# Patient Record
Sex: MALE | Race: WHITE | NOT HISPANIC OR LATINO | Employment: FULL TIME | URBAN - METROPOLITAN AREA
[De-identification: names, ages, dates, MRNs, and addresses within clinical notes are randomized per-mention and may not be internally consistent; named-entity substitution may affect disease eponyms.]

---

## 2018-01-18 ENCOUNTER — APPOINTMENT (EMERGENCY)
Dept: CT IMAGING | Facility: HOSPITAL | Age: 23
End: 2018-01-18
Payer: COMMERCIAL

## 2018-01-18 ENCOUNTER — HOSPITAL ENCOUNTER (EMERGENCY)
Facility: HOSPITAL | Age: 23
Discharge: HOME | End: 2018-01-19
Attending: EMERGENCY MEDICINE | Admitting: EMERGENCY MEDICINE
Payer: COMMERCIAL

## 2018-01-18 VITALS
HEIGHT: 74 IN | SYSTOLIC BLOOD PRESSURE: 121 MMHG | BODY MASS INDEX: 27.36 KG/M2 | WEIGHT: 213.2 LBS | DIASTOLIC BLOOD PRESSURE: 62 MMHG | OXYGEN SATURATION: 99 % | RESPIRATION RATE: 16 BRPM | TEMPERATURE: 98.1 F | HEART RATE: 86 BPM

## 2018-01-18 DIAGNOSIS — S01.81XA FACIAL LACERATION, INITIAL ENCOUNTER: ICD-10-CM

## 2018-01-18 DIAGNOSIS — S09.90XA INJURY OF HEAD, INITIAL ENCOUNTER: Primary | ICD-10-CM

## 2018-01-18 DIAGNOSIS — G40.919 BREAKTHROUGH SEIZURE (HCC): ICD-10-CM

## 2018-01-18 LAB
BASOPHILS # BLD AUTO: 0.02 THOUSANDS/ΜL (ref 0–0.1)
BASOPHILS NFR BLD AUTO: 0 % (ref 0–1)
EOSINOPHIL # BLD AUTO: 0.02 THOUSAND/ΜL (ref 0–0.61)
EOSINOPHIL NFR BLD AUTO: 0 % (ref 0–6)
ERYTHROCYTE [DISTWIDTH] IN BLOOD BY AUTOMATED COUNT: 11.9 % (ref 11.6–15.1)
HCT VFR BLD AUTO: 45.2 % (ref 36.5–49.3)
HGB BLD-MCNC: 15.9 G/DL (ref 12–17)
LYMPHOCYTES # BLD AUTO: 1.29 THOUSANDS/ΜL (ref 0.6–4.47)
LYMPHOCYTES NFR BLD AUTO: 15 % (ref 14–44)
MCH RBC QN AUTO: 29.4 PG (ref 26.8–34.3)
MCHC RBC AUTO-ENTMCNC: 35.2 G/DL (ref 31.4–37.4)
MCV RBC AUTO: 84 FL (ref 82–98)
MONOCYTES # BLD AUTO: 0.58 THOUSAND/ΜL (ref 0.17–1.22)
MONOCYTES NFR BLD AUTO: 7 % (ref 4–12)
NEUTROPHILS # BLD AUTO: 6.53 THOUSANDS/ΜL (ref 1.85–7.62)
NEUTS SEG NFR BLD AUTO: 77 % (ref 43–75)
NRBC BLD AUTO-RTO: 0 /100 WBCS
PLATELET # BLD AUTO: 256 THOUSANDS/UL (ref 149–390)
PMV BLD AUTO: 10.5 FL (ref 8.9–12.7)
RBC # BLD AUTO: 5.4 MILLION/UL (ref 3.88–5.62)
WBC # BLD AUTO: 8.47 THOUSAND/UL (ref 4.31–10.16)

## 2018-01-18 PROCEDURE — 85025 COMPLETE CBC W/AUTO DIFF WBC: CPT | Performed by: EMERGENCY MEDICINE

## 2018-01-18 PROCEDURE — 72125 CT NECK SPINE W/O DYE: CPT

## 2018-01-18 PROCEDURE — 80048 BASIC METABOLIC PNL TOTAL CA: CPT | Performed by: EMERGENCY MEDICINE

## 2018-01-18 PROCEDURE — 70486 CT MAXILLOFACIAL W/O DYE: CPT

## 2018-01-18 PROCEDURE — 36415 COLL VENOUS BLD VENIPUNCTURE: CPT | Performed by: EMERGENCY MEDICINE

## 2018-01-18 PROCEDURE — 84146 ASSAY OF PROLACTIN: CPT | Performed by: EMERGENCY MEDICINE

## 2018-01-18 PROCEDURE — 70450 CT HEAD/BRAIN W/O DYE: CPT

## 2018-01-18 RX ORDER — LIDOCAINE HYDROCHLORIDE AND EPINEPHRINE 10; 10 MG/ML; UG/ML
10 INJECTION, SOLUTION INFILTRATION; PERINEURAL ONCE
Status: COMPLETED | OUTPATIENT
Start: 2018-01-18 | End: 2018-01-18

## 2018-01-18 RX ORDER — GINSENG 100 MG
1 CAPSULE ORAL ONCE
Status: COMPLETED | OUTPATIENT
Start: 2018-01-19 | End: 2018-01-19

## 2018-01-18 RX ADMIN — LIDOCAINE HYDROCHLORIDE,EPINEPHRINE BITARTRATE 10 ML: 10; .01 INJECTION, SOLUTION INFILTRATION; PERINEURAL at 23:45

## 2018-01-19 LAB
ANION GAP SERPL CALCULATED.3IONS-SCNC: 10 MMOL/L (ref 4–13)
BUN SERPL-MCNC: 16 MG/DL (ref 5–25)
CALCIUM SERPL-MCNC: 9.5 MG/DL (ref 8.3–10.1)
CHLORIDE SERPL-SCNC: 104 MMOL/L (ref 100–108)
CO2 SERPL-SCNC: 26 MMOL/L (ref 21–32)
CREAT SERPL-MCNC: 1.38 MG/DL (ref 0.6–1.3)
GFR SERPL CREATININE-BSD FRML MDRD: 73 ML/MIN/1.73SQ M
GLUCOSE SERPL-MCNC: 130 MG/DL (ref 65–140)
POTASSIUM SERPL-SCNC: 3.8 MMOL/L (ref 3.5–5.3)
PROLACTIN SERPL-MCNC: 23.8 NG/ML (ref 2.5–17.4)
SODIUM SERPL-SCNC: 140 MMOL/L (ref 136–145)

## 2018-01-19 PROCEDURE — 99284 EMERGENCY DEPT VISIT MOD MDM: CPT

## 2018-01-19 RX ADMIN — BACITRACIN ZINC 1 LARGE APPLICATION: 500 OINTMENT TOPICAL at 00:21

## 2018-01-19 NOTE — ED PROVIDER NOTES
History  Chief Complaint   Patient presents with    Seizure - Prior Hx Of     pt had seizure at home  pt has prior hx of only one seizure in the past     Fall     while having seizure, pt fell and hit head on corner of dresser  pt has laceration on left upper eyebrow  pt alert and oriented at this time     HPI patient is a 82-year-old male, history of a seizure in the past, apparently saw a neurologist had negative testing, patient reports negative MRI and believes negative EEG at that time  Patient apparently had a seizure at home tonight  He is visiting somewhat in the area sat up and apparently fell hitting his head on the nightstand  Friend apparently witnessed an obvious tonic-clonic grand mal seizure  Patient is now awake and alert  Past medical history:  Asthma,  Apparent workup for seizures in the past after single seizure 2 years ago, patient reports negative diagnostic testing, did not start any medication  Family history noncontributory  Social history, nonsmoker no history of drug abuse, visiting the area    None       Past Medical History:   Diagnosis Date    Asthma     Seizure [facility])     only one prior       History reviewed  No pertinent surgical history  History reviewed  No pertinent family history  I have reviewed and agree with the history as documented  Social History   Substance Use Topics    Smoking status: Never Smoker    Smokeless tobacco: Never Used    Alcohol use Yes      Comment: occasionally        Review of Systems   Constitutional: Negative for diaphoresis, fatigue and fever  HENT: Positive for facial swelling  Negative for congestion, ear pain, nosebleeds and sore throat  Eyes: Negative for photophobia, pain, discharge and visual disturbance  Respiratory: Negative for cough, choking, chest tightness, shortness of breath and wheezing  Cardiovascular: Negative for chest pain and palpitations     Gastrointestinal: Negative for abdominal distention, abdominal pain, diarrhea and vomiting  Genitourinary: Negative for dysuria, flank pain and frequency  Musculoskeletal: Negative for back pain, gait problem and joint swelling  Skin: Positive for wound  Negative for color change and rash  Neurological: Positive for seizures and headaches  Negative for dizziness and syncope  Psychiatric/Behavioral: Negative for behavioral problems and confusion  The patient is not nervous/anxious  All other systems reviewed and are negative  Physical Exam  ED Triage Vitals [01/18/18 2324]   Temperature Pulse Respirations Blood Pressure SpO2   98 1 °F (36 7 °C) 86 16 121/62 99 %      Temp Source Heart Rate Source Patient Position - Orthostatic VS BP Location FiO2 (%)   Oral Monitor Lying Right arm --      Pain Score       7           Orthostatic Vital Signs  Vitals:    01/18/18 2324   BP: 121/62   Pulse: 86   Patient Position - Orthostatic VS: Lying       Physical Exam   Constitutional: He is oriented to person, place, and time  He appears well-developed and well-nourished  HENT:   Head: Normocephalic  Right Ear: External ear normal    Left Ear: External ear normal    Nose: Nose normal    Mouth/Throat: Oropharynx is clear and moist    Eyes: EOM and lids are normal  Pupils are equal, round, and reactive to light  There is no hyphema, 2 5 cm laceration just above the left eyebrow   Neck: Normal range of motion  Neck supple  Pulmonary/Chest: Effort normal  No respiratory distress  Abdominal: Soft  Normal appearance  There is no tenderness  Musculoskeletal: Normal range of motion  He exhibits no deformity  Neurological: He is alert and oriented to person, place, and time  He has normal strength  No cranial nerve deficit or sensory deficit  Coordination normal    Skin: Skin is warm and dry  Psychiatric: He has a normal mood and affect  Nursing note and vitals reviewed     Pulse oximetry 99% on room air adequate oxygenation, there is no hypoxia    ED Medications  Medications   lidocaine-epinephrine (XYLOCAINE/EPINEPHRINE) 1 %-1:100,000 injection 10 mL (10 mL Infiltration Given 1/18/18 2345)   bacitracin topical ointment 1 large application (1 large application Topical Given 1/19/18 0021)       Diagnostic Studies  Results Reviewed     Procedure Component Value Units Date/Time    Prolactin [[id number]]  (Abnormal) Collected:  01/18/18 2352    Lab Status:  Final result Specimen:  Blood from Arm, Right Updated:  01/19/18 7438     Prolactin 23 8 (H) ng/mL     Basic metabolic panel [[id number]]  (Abnormal) Collected:  01/18/18 2352    Lab Status:  Final result Specimen:  Blood from Arm, Right Updated:  01/19/18 0008     Sodium 140 mmol/L      Potassium 3 8 mmol/L      Chloride 104 mmol/L      CO2 26 mmol/L      Anion Gap 10 mmol/L      BUN 16 mg/dL      Creatinine 1 38 (H) mg/dL      Glucose 130 mg/dL      Calcium 9 5 mg/dL      eGFR 73 ml/min/1 73sq m     Narrative:         National Kidney Disease Education Program recommendations are as follows:  GFR calculation is accurate only with a steady state creatinine  Chronic Kidney disease less than 60 ml/min/1 73 sq  meters  Kidney failure less than 15 ml/min/1 73 sq  meters      CBC and differential [[id number]]  (Abnormal) Collected:  01/18/18 2352    Lab Status:  Final result Specimen:  Blood from Arm, Right Updated:  01/18/18 2358     WBC 8 47 Thousand/uL      RBC 5 40 Million/uL      Hemoglobin 15 9 g/dL      Hematocrit 45 2 %      MCV 84 fL      MCH 29 4 pg      MCHC 35 2 g/dL      RDW 11 9 %      MPV 10 5 fL      Platelets 321 Thousands/uL      nRBC 0 /100 WBCs      Neutrophils Relative 77 (H) %      Lymphocytes Relative 15 %      Monocytes Relative 7 %      Eosinophils Relative 0 %      Basophils Relative 0 %      Neutrophils Absolute 6 53 Thousands/µL      Lymphocytes Absolute 1 29 Thousands/µL      Monocytes Absolute 0 58 Thousand/µL      Eosinophils Absolute 0 02 Thousand/µL      Basophils Absolute 0 02 Thousands/µL CT facial bones without contrast   Final Result by Alexandra Lane MD (01/19 9619)      No evidence of acute traumatic injury to the facial bones  Globes are intact  No orbital hematoma  Findings are consistent with the preliminary report from [facility] which was provided shortly after completion of the exam                       Workstation performed: [id number]         CT spine cervical without contrast   Final Result by Alexandra Lane MD (01/19 7223)      No cervical spine fracture or traumatic malalignment  Findings are consistent with the preliminary report from [facility] which was provided shortly after completion of the exam                                          Workstation performed: [id number]         CT head without contrast   Final Result by Alexandra Lane MD (01/19 5816)      No acute intracranial process or seizure focus insofar as can be detected on a noncontrast CT  No skull fracture  Findings are consistent with the preliminary report from [facility] which was provided shortly after completion of the exam                              Workstation performed: [id number]                    Procedures  Lac Repair  Date/Time: 1/18/2018 11:50 PM  Performed by: Marni Hernandez by: Gerda Glover   Consent: Verbal consent obtained  Consent given by: patient  Patient understanding: patient states understanding of the procedure being performed  Body area: head/neck  Location details: left eyebrow  Laceration length: 2 5 cm  Tendon involvement: none  Nerve involvement: none  Anesthesia: local infiltration    Anesthesia:  Local Anesthetic: lidocaine 1% with epinephrine    Wound Dehiscence:  Superficial Wound Dehiscence: simple closure      Procedure Details:  Preparation: Patient was prepped and draped in the usual sterile fashion    Irrigation solution: saline  Skin closure: 6-0 nylon  Subcutaneous closure: 5-0 Vicryl  Number of sutures: 5  Technique: complex  Approximation: close  Approximation difficulty: complex  Dressing: antibiotic ointment  Comments: Closed with 1 Vicryl stitch subcutaneously than 4 times 6 0 nylon sutures             Phone Contacts  ED Phone Contact    ED Course  ED Course           diagnostic testing normal electrolytes no sign of hyponatremia or other electrolyte causes of seizures, white count was normal no sign of inflammation, hemoglobin was normal at 15 9 no sign of bleeding  CT scan of brain done because patient had a seizure but also his head showed no intracranial bleeding  CT scan cervical spine because of some mild tenderness in patient presented with postictal state showed no fracture, CT scan of the face showed no facial fracture  ER course Patient arrived somewhat somnolent, somewhat postictal but quickly became alert and awake, nonfocal neurological exam                 MDM medical decision making 27-year-old male, here from [place] visiting the area, history of a seizure 2 years ago negative workup at that time, tonight had a 2nd seizure, discussed with patient no indication for medications will require neurology follow-up  No fracture seen  Patient laceration that was repaired  We discussed outpatient follow-up we discussed indications to return  Patient was neurologically intact at discharge    CritCare Time    Disposition  Final diagnoses:   Injury of head, initial encounter   Facial laceration, initial encounter   Breakthrough seizure ([address] )     Time reflects when diagnosis was documented in both MDM as applicable and the Disposition within this note     Time User Action Codes Description Comment    1/19/2018  1:20 AM Dottie Lev Add [S09 90XA] Injury of head, initial encounter     1/19/2018  1:20 AM Dottie Lev Add [S01 81XA] Facial laceration, initial encounter     1/19/2018  1:20 AM Dottie Lev Add [G40 919] Breakthrough seizure [facility])       ED Disposition     ED Disposition Condition Comment    Discharge  Edilesteban Wallaceley discharge to home/self care  Condition at discharge: Good        Follow-up Information     Follow up With Specialties Details Why Contact Marvin Corral MD Neurology   [place] [address]  [phone]          There are no discharge medications for this patient  No discharge procedures on file      ED Provider  Electronically Signed by           Petrona Shaver MD  01/19/18 2890

## 2018-01-19 NOTE — DISCHARGE INSTRUCTIONS
Suture removal in 5 days, can return for suture removal, or urgent care  Watch for redness swelling signs of infection  Follow up with your neurologist at home review stay in the area with our neurologist      Epilepsy   WHAT YOU NEED TO KNOW:   Epilepsy is a brain disorder that causes seizures  It is also called a seizure disorder  A seizure means an abnormal area in your brain sometimes sends bursts of electrical activity  A seizure may start in one part of your brain, or both sides may be affected  Depending on the type of seizure, you may have movements you cannot control, lose consciousness, or stare straight ahead  You may be confused or tired after the seizure  A seizure may last a few seconds or longer than 5 minutes  A birth defect, tumor, stroke, dementia, injury, or infection may cause epilepsy  The cause of your epilepsy may not be known  If your seizures are not controlled, epilepsy may become life-threatening  DISCHARGE INSTRUCTIONS:   Call 911 for any of the following:   · Your seizure lasts longer than 5 minutes  · You have trouble breathing after a seizure  · You have diabetes or are pregnant and have a seizure  · You have a seizure in water, such as a swimming pool or bathtub  Return to the emergency department if:   · You have a second seizure within 24 hours of the first      · You are injured during a seizure  Contact your healthcare provider if:   · You feel you are not able to cope with your condition  · Your seizures start to happen more often  · You are confused longer than usual after a seizure  · You are planning to get pregnant or are currently pregnant  · You have questions or concerns about your condition or care  Medicines:   · Antiseizure medicine  may control or prevent another seizure  Do not stop taking this medicine  Another person may need to give you rescue medicine to stop a seizure at home   Ask your healthcare provider for more information about rescue medicine  · Take your medicine as directed  Contact your healthcare provider if you think your medicine is not helping or if you have side effects  Tell him of her if you are allergic to any medicine  Keep a list of the medicines, vitamins, and herbs you take  Include the amounts, and when and why you take them  Bring the list or the pill bottles to follow-up visits  Carry your medicine list with you in case of an emergency  Follow up with your neurologist as directed: You may need tests to check the level of antiseizure medicine in your blood  Your neurologist may need to change or adjust your medicine  Write down your questions so you remember to ask them during your visits  What you can do to prevent a seizure: You may not be able to prevent every seizure  The following can help you manage triggers that may make a seizure start:  · Take your medicine every day at the same time  This will also help prevent medicine side effects  Set an alarm to help remind you to take your medicine every day  · Manage stress  Stress can be a trigger for epilepsy  Exercise can help you reduce stress  Talk to your healthcare provider about exercise that is safe for you  Illness can be a form of stress  Eat a variety of healthy foods and drink plenty of liquids during an illness  Talk to your healthcare provider about other ways to manage stress  · Set a regular sleep schedule  A lack of sleep can trigger a seizure  Try to go to sleep and wake up at the same time every day  Keep your bedroom quiet and dark  Talk to your healthcare provider if you are having trouble sleeping  · Limit or do not drink alcohol as directed  Alcohol can trigger a seizure, especially if you drink a large amount at one time  A drink of alcohol is 12 ounces of beer, 1½ ounces of liquor, or 5 ounces of wine  Talk to your healthcare provider about a safe amount of alcohol for you   Your provider may recommend that you do not drink any alcohol  Tell him or her if you need help to quit drinking  What you can do to manage epilepsy:   · Keep a seizure diary  This can help you find your triggers and avoid them  Write down the dates of your seizures, where you were, and what you were doing  Include how you felt before and after  Possible triggers include illness, lack of sleep, hormonal changes, alcohol, drugs, lights, or stress  · Record any auras you have before a seizure  An aura is a sign that you are about to have a seizure  Auras happen before certain types of seizures that are in only 1 part of the brain  The aura may happen seconds before a seizure, or up to an hour before  You may feel, see, hear, or smell something  Examples include part of your body becoming hot  You may see a flash of light or hear something  You may have anxiety or déjà vu  If you have an aura, include it in your seizure diary  · Create a care plan  Tell family, friends, and coworkers about your epilepsy  Give them instructions that tell them how they can keep you safe if you have a seizure  · Find support  You may be referred to a psychologist or   Ask your healthcare provider about support groups for people with epilepsy  · Ask what safety precautions you should take  Talk with your healthcare provider about driving  You may not be able to drive until you are seizure-free for a period of time  You will need to check the law where you live  Also talk to your healthcare provider about swimming and bathing  You may drown or develop life-threatening heart or lung damage if you have a seizure in water  · Carry medical alert identification  Wear medical alert jewelry or carry a card that says you have epilepsy  Ask your healthcare provider where to get these items  How others can keep you safe during a seizure:  Give the following instructions to family, friends, and coworkers:  · Do not panic      · Do not hold me down or put anything in my mouth  · Gently guide me to the floor or a soft surface  · Place me on my side to help prevent me from swallowing saliva or vomit  · Protect me from injury  Remove sharp or hard objects from the area surrounding me, or cushion my head  · Loosen the clothing around my head and neck  · Time how long my seizure lasts  Call 911 if my seizure lasts longer than 5 minutes or if I have a second seizure  · Stay with me until my seizure ends  Let me rest until I am fully awake  · Perform CPR if I stop breathing or you cannot feel my pulse  · Do not give me anything to eat or drink until I am fully awake  © 2017 [employer] Information is for End User's use only and may not be sold, redistributed or otherwise used for commercial purposes  All illustrations and images included in CareNotes® are the copyrighted property of [employer]  or [employer]  The above information is an  only  It is not intended as medical advice for individual conditions or treatments  Talk to your doctor, nurse or pharmacist before following any medical regimen to see if it is safe and effective for you  Laceration   WHAT YOU NEED TO KNOW:   A laceration is an injury to the skin and the soft tissue underneath it  Lacerations happen when you are cut or hit by something  They can happen anywhere on the body  DISCHARGE INSTRUCTIONS:   Return to the emergency department if:   · You have heavy bleeding or bleeding that does not stop after 10 minutes of holding firm, direct pressure over the wound  · Your wound opens up  Contact your healthcare provider if:   · You have a fever or chills  · Your laceration is red, warm, or swollen  · You have red streaks on your skin coming from your wound  · You have white or yellow drainage from the wound that smells bad  · You have pain that gets worse, even after treatment       · You have questions or concerns about your condition or care  Medicines:   · Prescription pain medicine  may be given  Ask how to take this medicine safely  · Antibiotics  help treat or prevent a bacterial infection  · Take your medicine as directed  Contact your healthcare provider if you think your medicine is not helping or if you have side effects  Tell him or her if you are allergic to any medicine  Keep a list of the medicines, vitamins, and herbs you take  Include the amounts, and when and why you take them  Bring the list or the pill bottles to follow-up visits  Carry your medicine list with you in case of an emergency  Care for your wound as directed:   · Do not get your wound wet  until your healthcare provider says it is okay  Do not soak your wound in water  Do not go swimming until your healthcare provider says it is okay  Carefully wash the wound with soap and water  Gently pat the area dry or allow it to air dry  · Change your bandages  when they get wet, dirty, or after washing  Apply new, clean bandages as directed  Do not apply elastic bandages or tape too tight  Do not put powders or lotions over your incision  · Apply antibiotic ointment as directed  Your healthcare provider may give you antibiotic ointment to put over your wound if you have stitches  If you have strips of tape over your incision, let them dry up and fall off on their own  If they do not fall off within 14 days, gently remove them  If you have glue over your wound, do not remove or pick at it  If your glue comes off, do not replace it with glue that you have at home  · Check your wound every day for signs of infection such as swelling, redness, or pus  Self-care:   · Apply ice  on your wound for 15 to 20 minutes every hour or as directed  Use an ice pack, or put crushed ice in a plastic bag  Cover it with a towel  Ice helps prevent tissue damage and decreases swelling and pain  · Use a splint as directed    A splint will decrease movement and stress on your wound  It may help it heal faster  A splint may be used for lacerations over joints or areas of your body that bend  Ask your healthcare provider how to apply and remove a splint  · Decrease scarring of your wound  by applying ointments as directed  Do not apply ointments until your healthcare provider says it is okay  You may need to wait until your wound is healed  Ask which ointment to buy and how often to use it  After your wound is healed, use sunscreen over the area when you are out in the sun  You should do this for at least 6 months to 1 year after your injury  Follow up with your healthcare provider as directed: You may need to follow up in 24 to 48 hours to have your wound checked for infection  You will need to return in 3 to 14 days if you have stitches or staples so they can be removed  Care for your wound as directed to prevent infection and help it heal  Write down your questions so you remember to ask them during your visits  © 2017 [address] [facility] Information is for End User's use only and may not be sold, redistributed or otherwise used for commercial purposes  All illustrations and images included in CareNotes® are the copyrighted property of [employer]  or [employer]  The above information is an  only  It is not intended as medical advice for individual conditions or treatments  Talk to your doctor, nurse or pharmacist before following any medical regimen to see if it is safe and effective for you  Head Injury   WHAT YOU NEED TO KNOW:   A head injury is most often caused by a blow to the head  This may occur from a fall, bicycle injury, sports injury, being struck in the head, or a motor vehicle accident  DISCHARGE INSTRUCTIONS:   Call 911 or have someone else call for any of the following:   · You cannot be woken  · You have a seizure  · You stop responding to others or you faint      · You have blurry or double vision  · Your speech becomes slurred or confused  · You have arm or leg weakness, loss of feeling, or new problems with coordination  · Your pupils are larger than usual or one pupil is a different size than the other  · You have blood or clear fluid coming out of your ears or nose  Return to the emergency department if:   · You have repeated or forceful vomiting  · You feel confused  · Your headache gets worse or becomes severe  · You or someone caring for you notices that you are harder to wake than usual   Contact your healthcare provider if:   · Your symptoms last longer than 6 weeks after the injury  · You have questions or concerns about your condition or care  Medicines:   · Acetaminophen  decreases pain  Acetaminophen is available without a doctor's order  Ask how much to take and how often to take it  Follow directions  Acetaminophen can cause liver damage if not taken correctly  · Take your medicine as directed  Contact your healthcare provider if you think your medicine is not helping or if you have side effects  Tell him or her if you are allergic to any medicine  Keep a list of the medicines, vitamins, and herbs you take  Include the amounts, and when and why you take them  Bring the list or the pill bottles to follow-up visits  Carry your medicine list with you in case of an emergency  Self-care:   · Rest  or do quiet activities for 24 to 48 hours  Limit your time watching TV, using the computer, or doing tasks that require a lot of thinking  Slowly return to your normal activities as directed  Do not play sports or do activities that may cause you to get hit in the head  Ask your healthcare provider when you can return to sports  · Apply ice  on your head for 15 to 20 minutes every hour or as directed  Use an ice pack, or put crushed ice in a plastic bag  Cover it with a towel before you apply it to your skin   Ice helps prevent tissue damage and decreases swelling and pain  · Have someone stay with you for 24 hours  or as directed  This person can monitor you for complications and call [phone]  When you are awake the person should ask you a few questions to see if you are thinking clearly  An example would be to ask your name or your address  Prevent another head injury:   · Wear a helmet that fits properly  Do this when you play sports, or ride a bike, scooter, or skateboard  Helmets help decrease your risk of a serious head injury  Talk to your healthcare provider about other ways you can protect yourself if you play sports  · Wear your seat belt every time you are in a car  This helps to decrease your risk for a head injury if you are in a car accident  Follow up with your healthcare provider as directed:  Write down your questions so you remember to ask them during your visits  © 2017 [employer] [employer] Information is for End User's use only and may not be sold, redistributed or otherwise used for commercial purposes  All illustrations and images included in CareNotes® are the copyrighted property of [employer]  or [employer]  The above information is an  only  It is not intended as medical advice for individual conditions or treatments  Talk to your doctor, nurse or pharmacist before following any medical regimen to see if it is safe and effective for you